# Patient Record
Sex: FEMALE | Race: BLACK OR AFRICAN AMERICAN | URBAN - METROPOLITAN AREA
[De-identification: names, ages, dates, MRNs, and addresses within clinical notes are randomized per-mention and may not be internally consistent; named-entity substitution may affect disease eponyms.]

---

## 2022-09-07 ENCOUNTER — OCCUPATIONAL HEALTH (OUTPATIENT)
Dept: URGENT CARE | Facility: CLINIC | Age: 52
End: 2022-09-07

## 2022-09-07 PROCEDURE — 80305 PR COLLECTION ONLY DRUG SCREEN: ICD-10-PCS | Mod: S$GLB,,, | Performed by: EMERGENCY MEDICINE

## 2022-09-07 PROCEDURE — 80305 DRUG TEST PRSMV DIR OPT OBS: CPT | Mod: S$GLB,,, | Performed by: EMERGENCY MEDICINE

## 2024-06-14 ENCOUNTER — OFFICE VISIT (OUTPATIENT)
Dept: URGENT CARE | Facility: CLINIC | Age: 54
End: 2024-06-14
Payer: OTHER MISCELLANEOUS

## 2024-06-14 VITALS
SYSTOLIC BLOOD PRESSURE: 119 MMHG | RESPIRATION RATE: 16 BRPM | BODY MASS INDEX: 30.36 KG/M2 | OXYGEN SATURATION: 98 % | HEART RATE: 78 BPM | WEIGHT: 165 LBS | TEMPERATURE: 98 F | HEIGHT: 62 IN | DIASTOLIC BLOOD PRESSURE: 77 MMHG

## 2024-06-14 DIAGNOSIS — M25.562 ACUTE PAIN OF LEFT KNEE: ICD-10-CM

## 2024-06-14 DIAGNOSIS — S40.011A CONTUSION OF RIGHT SHOULDER, INITIAL ENCOUNTER: ICD-10-CM

## 2024-06-14 DIAGNOSIS — M25.511 ACUTE PAIN OF RIGHT SHOULDER: ICD-10-CM

## 2024-06-14 DIAGNOSIS — Z02.6 ENCOUNTER RELATED TO WORKER'S COMPENSATION CLAIM: Primary | ICD-10-CM

## 2024-06-14 DIAGNOSIS — R51.9 GENERALIZED HEADACHE: ICD-10-CM

## 2024-06-14 PROCEDURE — 99214 OFFICE O/P EST MOD 30 MIN: CPT | Mod: S$GLB,,, | Performed by: STUDENT IN AN ORGANIZED HEALTH CARE EDUCATION/TRAINING PROGRAM

## 2024-06-14 RX ORDER — NAPROXEN 500 MG/1
500 TABLET ORAL 2 TIMES DAILY WITH MEALS
Qty: 14 TABLET | Refills: 0 | Status: SHIPPED | OUTPATIENT
Start: 2024-06-14 | End: 2024-06-21

## 2024-06-14 NOTE — LETTER
[facility]  [address] [address]  Phone: [phone]  [employer]: [id number]    Pt Name: Yeimy Chu  Injury Date: 06/14/2024   Employee ID: [id number] Date of First Treatment: 06/14/2024   Company: [employer]       Appointment Time: 02:15 PM Arrived: 2:35   Provider: Brian Castellano NP Time Out:4:46     Office Treatment:   1. Encounter related to worker's compensation claim    2. Acute pain of right shoulder    3. Acute pain of left knee    4. Generalized headache    5. Contusion of multiple sites of right shoulder, initial encounter      Medications Ordered This Encounter   Medications    naproxen (NAPROSYN) 500 MG tablet                 Return Appointment: Visit date not found at 1 week 6/21/2024

## 2024-06-14 NOTE — PROGRESS NOTES
Subjective:      Patient ID: Yeimy Chu is a 54 y.o. female.    Chief Complaint: Fall    Patient is a 54-year-old female who presents to clinic for evaluation of work related injury.  Patient reports injury occurred yesterday Thursday June 13, 2024.  Patient states she works as a  at [employer].  Patient states she was sitting in a rolling chair approximately 3 foot tall at the time when it move forward causing her to fall backwards.  Patient states she landed on her back.  Patient states she struck the back of her head and right shoulder during the fall.  Patient states also started having left knee pain.  Patient states she believes this was whenever she attempted to get herself off the ground when she used her left knee to press up.  Patient states that she had a few moments of dizziness and lightheadedness after her initial fall however that improved within 1-1.5 hours.  Patient states that she has experienced slight decreased range of motion of the left shoulder stating it hurts to move.  Patient states that she has not noticed any open wounds, skin discoloration, or paresthesias to including numbness or tingling.  Patient states pain at current is rated a 3 or 4 on 10 scale but as worst a 7 or 8 on 10 scale.  Patient denies radiation of this pain.  Patient states she has not experienced any neck or back pain, visual disturbances, chest pain or shortness for breath, abdominal pain, nausea or vomiting, or change in mentation.  Patient denies loss of consciousness with fall.    Other  This is a new problem. The current episode started yesterday. Associated symptoms include arthralgias (Right shoulder and left knee). Pertinent negatives include no abdominal pain, chest pain, chills, coughing, diaphoresis, fatigue, fever, headaches, joint swelling, nausea, neck pain, numbness, rash or vomiting. Associated symptoms comments: Right arm pain, headaches. She has tried acetaminophen for the  symptoms.       Constitution: Negative. Negative for chills, sweating, fatigue and fever.   HENT: Negative.     Neck: neck negative. Negative for neck pain.   Cardiovascular: Negative.  Negative for chest pain and palpitations.   Eyes: Negative.  Negative for photophobia, vision loss, double vision and blurred vision.   Respiratory: Negative.  Negative for chest tightness, cough and shortness of breath.    Gastrointestinal: Negative.  Negative for abdominal pain, nausea, vomiting and diarrhea.   Endocrine: negative.   Genitourinary: Negative.  Negative for dysuria, frequency and urgency.   Musculoskeletal:  Positive for trauma (Fall from chair) and joint pain (Right shoulder and left knee). Negative for joint swelling and back pain. Limited range of motion: Right shoulder.  Skin: Negative.  Negative for color change, pale, rash, wound and erythema.   Allergic/Immunologic: Negative.    Neurological:  Positive for dizziness (Reports resolved) and light-headedness (Reports resolved). Negative for passing out, headaches, disorientation, altered mental status, numbness and tingling.   Hematologic/Lymphatic: Negative.    Psychiatric/Behavioral: Negative.  Negative for altered mental status, disorientation and confusion.      Objective:     Physical Exam  Vitals and nursing note reviewed.   Constitutional:       General: She is not in acute distress.     Appearance: Normal appearance. She is not ill-appearing, toxic-appearing or diaphoretic.   HENT:      Head: Normocephalic and atraumatic.      Comments: No contusion, crepitus, hematoma, skin discoloration, swelling, or tenderness to palpation noted.     Right Ear: External ear normal.      Left Ear: External ear normal.      Nose: Nose normal.      Mouth/Throat:      Mouth: Mucous membranes are moist.      Pharynx: Oropharynx is clear.   Eyes:      Extraocular Movements: Extraocular movements intact.      Conjunctiva/sclera: Conjunctivae normal.      Pupils: Pupils are  equal, round, and reactive to light.   Cardiovascular:      Pulses: Normal pulses.      Heart sounds: Normal heart sounds.   Pulmonary:      Effort: Pulmonary effort is normal. No respiratory distress.      Breath sounds: Normal breath sounds. No wheezing, rhonchi or rales.   Abdominal:      General: Bowel sounds are normal. There is no distension.      Palpations: Abdomen is soft.      Tenderness: There is no abdominal tenderness.   Musculoskeletal:      Right shoulder: Tenderness present. No swelling or crepitus. Decreased range of motion. Normal strength.      Cervical back: Normal range of motion and neck supple. No rigidity or tenderness.      Left knee: No swelling, erythema, ecchymosis or crepitus. Normal range of motion. Tenderness present. Normal alignment, normal meniscus and normal patellar mobility.   Skin:     General: Skin is warm and dry.      Capillary Refill: Capillary refill takes less than 2 seconds.      Coloration: Skin is not pale.      Findings: No bruising, erythema or rash.   Neurological:      General: No focal deficit present.      Mental Status: She is alert and oriented to person, place, and time.      Cranial Nerves: No cranial nerve deficit.      Sensory: No sensory deficit.      Motor: No weakness.      Coordination: Coordination normal.      Gait: Gait normal.   Psychiatric:         Mood and Affect: Mood normal.         Behavior: Behavior normal.         Thought Content: Thought content normal.         Judgment: Judgment normal.        Assessment:      1. Encounter related to worker's compensation claim    2. Acute pain of right shoulder    3. Acute pain of left knee    4. Generalized headache    5. Contusion of right shoulder, initial encounter      Plan:       Medications Ordered This Encounter   Medications    naproxen (NAPROSYN) 500 MG tablet     Sig: Take 1 tablet (500 mg total) by mouth 2 (two) times daily with meals. for 7 days     Dispense:  14 tablet     Refill:  0             Follow up if symptoms worsen or fail to improve.    Right shoulder: Overall alignment is within normal limits. No displaced fracture, dislocation or destructive osseous process. Minimal DJD. No subcutaneous emphysema or radiopaque foreign body. No right apical pneumothorax.   X-ray left knee: Bones are well mineralized. Overall alignment is within normal limits. No displaced fracture, dislocation or destructive osseous process. No large joint effusion seen. Minimal spurring of the posterior patella. No subcutaneous emphysema or radiopaque foreign body.   Patient placed in right shoulder sling in clinic.    Work restrictions as listed; see scanned document.    Use of no other NSAIDs while on naproxen; may rotate with Tylenol.    Recommend rotating ice and warm moist heat as directed.    Discussed imaging such as CT head with patient.  Patient without hematoma of the head, skin discoloration, tenderness to palpation of the scalp, open wound, visual disturbances, or any dizziness.  Patient in agreement to hold off on imaging of the head such as CT head at this time.  Follow-up in clinic in 1 week for follow-up evaluation and possible work clearance; sooner as needed.    Follow-up with PCP as needed.    To ED for any new or acutely worsening symptoms.    Patient in agreement with plan of care.    DISCLAIMER: Please note that my documentation in this Electronic Healthcare Record was produced using speech recognition software and therefore may contain errors related to that software system.These could include grammar, punctuation and spelling errors or the inclusion/exclusion of phrases that were not intended. Garbled syntax, mangled pronouns, and other bizarre constructions may be attributed to that software system.

## 2024-06-21 ENCOUNTER — OFFICE VISIT (OUTPATIENT)
Dept: URGENT CARE | Facility: CLINIC | Age: 54
End: 2024-06-21
Payer: OTHER MISCELLANEOUS

## 2024-06-21 VITALS
WEIGHT: 167.63 LBS | TEMPERATURE: 98 F | BODY MASS INDEX: 30.85 KG/M2 | RESPIRATION RATE: 20 BRPM | SYSTOLIC BLOOD PRESSURE: 130 MMHG | OXYGEN SATURATION: 98 % | HEIGHT: 62 IN | DIASTOLIC BLOOD PRESSURE: 83 MMHG | HEART RATE: 69 BPM

## 2024-06-21 DIAGNOSIS — M25.562 ACUTE PAIN OF LEFT KNEE: ICD-10-CM

## 2024-06-21 DIAGNOSIS — M25.511 ACUTE PAIN OF RIGHT SHOULDER: Primary | ICD-10-CM

## 2024-06-21 RX ORDER — IBUPROFEN 800 MG/1
800 TABLET ORAL EVERY 8 HOURS PRN
Qty: 20 TABLET | Refills: 0 | Status: SHIPPED | OUTPATIENT
Start: 2024-06-21

## 2024-06-21 NOTE — LETTER
[facility]  [address] [address]  Phone: [phone]  [facility] [employer]: [id number]    Pt Name: Yeimy Chu  Injury Date: 06/14/2024   Employee ID:  Date of First Treatment: 06/21/2024   Company: [email or web address]      Appointment Time: 02:00 PM Arrived: 2:13pm   Provider: Joseph Alanis NP Time Out:3:30pm     Office Treatment:   1. Acute pain of right shoulder    2. Acute pain of left knee      Medications Ordered This Encounter   Medications    ibuprofen (ADVIL,MOTRIN) 800 MG tablet                 Return Appointment:06-

## 2024-06-21 NOTE — PROGRESS NOTES
"Subjective:      Patient ID: Yeimy Chu is a 54 y.o. female.    Chief Complaint: Work Related Visit    DOI: 06-; R/V W/C  Pt states" Same pain in R shoulder, L knee, and HA. Taking Rx pain meds but they are not helping."   Yeimy Chu is a 54 year old female presenting to the clinic for work comp follow up. She reports no improvement of pain in the right shoulder or knee. She continues to have headaches. Has been taking naprosyn without relief.         Constitution: Negative.   HENT: Negative.     Neck: neck negative.   Cardiovascular: Negative.    Eyes: Negative.    Respiratory: Negative.     Gastrointestinal: Negative.    Genitourinary: Negative.    Musculoskeletal:  Positive for pain.   Skin: Negative.    Neurological:  Positive for headaches.     Objective:     Physical Exam  Constitutional:       General: She is not in acute distress.     Appearance: Normal appearance. She is not ill-appearing.   HENT:      Head: Normocephalic and atraumatic.      Right Ear: External ear normal.      Left Ear: External ear normal.   Eyes:      Conjunctiva/sclera: Conjunctivae normal.   Cardiovascular:      Rate and Rhythm: Normal rate.   Pulmonary:      Effort: Pulmonary effort is normal. No respiratory distress.   Musculoskeletal:         General: Normal range of motion.      Right shoulder: Normal.      Cervical back: Normal range of motion.      Left knee: Normal. No tenderness.   Neurological:      Mental Status: She is alert and oriented to person, place, and time.   Psychiatric:         Mood and Affect: Mood normal.        Assessment:      1. Acute pain of right shoulder    2. Acute pain of left knee      Plan:   Patient requests to keep same work restrictions from last week and will recheck in one week. Change naprosyn to ibuprofen 800 mg. Use sling as needed. Return in one week.               No follow-ups on file.      "

## 2024-06-28 ENCOUNTER — OFFICE VISIT (OUTPATIENT)
Dept: URGENT CARE | Facility: CLINIC | Age: 54
End: 2024-06-28

## 2024-06-28 VITALS
OXYGEN SATURATION: 99 % | HEIGHT: 62 IN | BODY MASS INDEX: 30.73 KG/M2 | WEIGHT: 167 LBS | DIASTOLIC BLOOD PRESSURE: 77 MMHG | HEART RATE: 80 BPM | RESPIRATION RATE: 18 BRPM | SYSTOLIC BLOOD PRESSURE: 117 MMHG | TEMPERATURE: 98 F

## 2024-06-28 DIAGNOSIS — Z02.6 ENCOUNTER RELATED TO WORKER'S COMPENSATION CLAIM: Primary | ICD-10-CM

## 2024-06-28 DIAGNOSIS — M25.511 ACUTE PAIN OF RIGHT SHOULDER: ICD-10-CM

## 2024-06-28 RX ORDER — IBUPROFEN 800 MG/1
800 TABLET ORAL EVERY 12 HOURS PRN
Qty: 14 TABLET | Refills: 0 | Status: SHIPPED | OUTPATIENT
Start: 2024-06-28 | End: 2024-07-05

## 2024-06-28 NOTE — LETTER
[facility]  [address] [address]  Phone: [phone]  [facility] [employer] [facility]: [id number]    Pt Name: Yeimy Chu  Injury Date: 06/13/2024   Employee ID: [id number] Date of First Treatment: 06/28/2024   Company: [employer]       Appointment Time: 12:20 PM Arrived: 12:40 pm   Provider: LOWELL Starkey Jr Time Out:1:46 pm     Office Treatment:   1. Encounter related to worker's compensation claim    2. Acute pain of right shoulder      Medications Ordered This Encounter   Medications    ibuprofen (ADVIL,MOTRIN) 800 MG tablet      Patient Instructions: Attention not to aggravate affected area, Use splint as directed    Restrictions: No lifting/pushing/pulling more than 10 lbs, Limited use of right hand and arm     Return Appointment: Visit date not found at 07/05/2024

## 2024-06-28 NOTE — PROGRESS NOTES
Subjective:      Patient ID: Yeimy Chu is a 54 y.o. female.    Chief Complaint: Shoulder Injury    W/C FOLLOW UP  DOI: 06/14/2024  follow up visit for fall sustained from sitting in chair while at work.  Patient states shoulder pain improving, knee pain resolved.  Patient states ibuprofen working well for covering her pain.  States she has been using sling intermittently but less and less due to increased mobility of shoulder.     Shoulder Injury   The pain is at a severity of 4/10. The pain is mild. Pertinent negatives include no chest pain or numbness. The symptoms are aggravated by movement. The treatment provided mild relief.       Constitution: Negative for chills and fever.   Cardiovascular:  Negative for chest pain, palpitations and sob on exertion.   Respiratory:  Negative for shortness of breath.    Musculoskeletal:  Positive for pain, trauma and joint pain. Negative for joint swelling and abnormal ROM of joint.   Skin:  Negative for erythema and bruising.   Neurological:  Negative for numbness and tingling.     Objective:     Physical Exam  Vitals and nursing note reviewed.   Constitutional:       General: She is not in acute distress.     Appearance: Normal appearance. She is not ill-appearing, toxic-appearing or diaphoretic.   HENT:      Head: Normocephalic and atraumatic.      Right Ear: External ear normal.      Left Ear: External ear normal.      Nose: Nose normal.      Mouth/Throat:      Mouth: Mucous membranes are moist.   Eyes:      General: No scleral icterus.     Conjunctiva/sclera: Conjunctivae normal.   Cardiovascular:      Rate and Rhythm: Normal rate and regular rhythm.      Pulses: Normal pulses.           Radial pulses are 2+ on the right side and 2+ on the left side.      Heart sounds: Normal heart sounds.      Comments: Right hand pink warm and dry.  Capillary refill less than 3 seconds  Pulmonary:      Effort: Pulmonary effort is normal. No respiratory distress.      Breath  sounds: Normal breath sounds. No stridor.   Musculoskeletal:      Right shoulder: No swelling, deformity, effusion, laceration, tenderness, bony tenderness or crepitus. Normal range of motion. Decreased strength. Normal pulse.        Arms:       Cervical back: Normal range of motion and neck supple.   Skin:     General: Skin is warm and dry.      Capillary Refill: Capillary refill takes 2 to 3 seconds.      Findings: No erythema.   Neurological:      General: No focal deficit present.      Mental Status: She is alert and oriented to person, place, and time.   Psychiatric:         Behavior: Behavior normal.         Thought Content: Thought content normal.         Judgment: Judgment normal.        Assessment:      1. Encounter related to worker's compensation claim    2. Acute pain of right shoulder      Plan:       Medications Ordered This Encounter   Medications    ibuprofen (ADVIL,MOTRIN) 800 MG tablet     Sig: Take 1 tablet (800 mg total) by mouth every 12 (twelve) hours as needed for Pain.     Dispense:  14 tablet     Refill:  0     Patient Instructions: Attention not to aggravate affected area, Use splint as directed   Restrictions: No lifting/pushing/pulling more than 10 lbs, Limited use of right hand and arm  Follow up if symptoms worsen or fail to improve.    The physical exam findings were discussed with the patient and all questions answered. We discussed symptom monitoring, conservative care methods, medication use, and follow up orders.  We discussed possible referral to physical therapy at next visit.  she verbalized understanding and agreement with the plan of care.

## 2024-06-28 NOTE — PATIENT INSTRUCTIONS
Increase clear fluid intake  May apply moist heat or heating pad to the area for 15 minutes every 2 hours as needed.  Take care not to fall sleep on heating pad as this may cause severe burns  Rest joint as needed using shoulder sling  Take ibuprofen as directed.  Take each dose with a full meal.  Do not take any additional NSAIDs while taking ibuprofen.  You may take additional Tylenol as needed between doses    Return to work with restrictions  Return to this clinic July 5, 2024 for re-evaluation  Return to clinic for new or worse symptoms

## 2024-07-05 ENCOUNTER — OFFICE VISIT (OUTPATIENT)
Dept: URGENT CARE | Facility: CLINIC | Age: 54
End: 2024-07-05

## 2024-07-05 VITALS
BODY MASS INDEX: 30.73 KG/M2 | RESPIRATION RATE: 16 BRPM | WEIGHT: 167 LBS | OXYGEN SATURATION: 98 % | HEIGHT: 62 IN | SYSTOLIC BLOOD PRESSURE: 114 MMHG | TEMPERATURE: 99 F | HEART RATE: 77 BPM | DIASTOLIC BLOOD PRESSURE: 79 MMHG

## 2024-07-05 DIAGNOSIS — S40.011A CONTUSION OF RIGHT SHOULDER, INITIAL ENCOUNTER: ICD-10-CM

## 2024-07-05 DIAGNOSIS — Z02.6 ENCOUNTER RELATED TO WORKER'S COMPENSATION CLAIM: Primary | ICD-10-CM

## 2024-07-05 DIAGNOSIS — R51.9 GENERALIZED HEADACHE: ICD-10-CM

## 2024-07-05 RX ORDER — IBUPROFEN 800 MG/1
800 TABLET ORAL 3 TIMES DAILY PRN
Qty: 30 TABLET | Refills: 0 | Status: SHIPPED | OUTPATIENT
Start: 2024-07-05

## 2024-07-05 NOTE — PROGRESS NOTES
"Subjective:      Patient ID: Yeimy Chu is a 54 y.o. female.    Vitals:  height is 5' 2" (1.575 m) and weight is 75.8 kg (167 lb). Her oral temperature is 98.5 °F (36.9 °C). Her blood pressure is 114/79 and her pulse is 77. Her respiration is 16 and oxygen saturation is 98%.     Chief Complaint: No chief complaint on file.    W/C FOLLOW UP  DOI: 06/14/2024  follow up visit for fall sustained from sitting in chair while at work.  Patient states she is feeling better and ready to resume normal activities at work.  Patient has been taking ibuprofen 800mg twice daily, which helps significantly.          Other  This is a new problem. The current episode started 1 to 4 weeks ago. Associated symptoms include arthralgias (resolved shoulder) and headaches (resolved after rx ibuprofen). She has tried NSAIDs for the symptoms.       Musculoskeletal:  Positive for trauma (s/p fall) and joint pain (resolved shoulder). Negative for back pain.   Neurological:  Positive for headaches (resolved after rx ibuprofen).      Objective:     Physical Exam   Constitutional: She is oriented to person, place, and time.  Non-toxic appearance. She does not appear ill. No distress.   HENT:   Head: Normocephalic and atraumatic.   Nose: Nose normal.   Mouth/Throat: Mucous membranes are moist.   Eyes: Conjunctivae are normal.   Cardiovascular: Normal rate.   Pulmonary/Chest: Effort normal. No respiratory distress.   Abdominal: Normal appearance.   Neurological: no focal deficit. She is alert and oriented to person, place, and time.   Skin: Skin is warm and dry.   Psychiatric: Her behavior is normal. Mood normal.   Nursing note and vitals reviewed.      Assessment:     1. Encounter related to worker's compensation claim    2. Contusion of right shoulder, initial encounter    3. Generalized headache        Plan:       Encounter related to worker's compensation claim    Contusion of right shoulder, initial encounter  -     ibuprofen " (ADVIL,MOTRIN) 800 MG tablet; Take 1 tablet (800 mg total) by mouth 3 (three) times daily as needed for Pain.  Dispense: 30 tablet; Refill: 0    Generalized headache